# Patient Record
Sex: MALE | Race: BLACK OR AFRICAN AMERICAN | NOT HISPANIC OR LATINO | Employment: OTHER | URBAN - METROPOLITAN AREA
[De-identification: names, ages, dates, MRNs, and addresses within clinical notes are randomized per-mention and may not be internally consistent; named-entity substitution may affect disease eponyms.]

---

## 2022-02-24 PROBLEM — H43.12 VITREOUS HEMORRHAGE, LEFT: Status: ACTIVE | Noted: 2022-02-24

## 2022-02-24 PROBLEM — E11.3513 PROLIFERATIVE DIABETIC RETINOPATHY OF BOTH EYES WITH MACULAR EDEMA ASSOCIATED WITH TYPE 2 DIABETES MELLITUS: Status: ACTIVE | Noted: 2022-02-24

## 2022-02-24 PROBLEM — H40.52X4 NEOVASCULAR GLAUCOMA OF LEFT EYE, INDETERMINATE STAGE: Status: ACTIVE | Noted: 2022-02-24

## 2022-03-11 PROBLEM — H04.123 DRY EYES: Status: ACTIVE | Noted: 2022-03-11

## 2022-03-11 PROBLEM — H40.52X3: Status: ACTIVE | Noted: 2022-02-24

## 2022-03-11 PROBLEM — H40.1133 PRIMARY OPEN ANGLE GLAUCOMA (POAG) OF BOTH EYES, SEVERE STAGE: Status: ACTIVE | Noted: 2022-03-11

## 2022-10-02 PROBLEM — I24.9 ACUTE CORONARY SYNDROME: Status: ACTIVE | Noted: 2022-10-02

## 2022-10-02 PROBLEM — I10 HYPERTENSION: Status: ACTIVE | Noted: 2022-10-02

## 2022-10-02 PROBLEM — R79.89 ELEVATED SERUM CREATININE: Status: ACTIVE | Noted: 2022-10-02

## 2022-10-02 PROBLEM — R79.89 ELEVATED BRAIN NATRIURETIC PEPTIDE (BNP) LEVEL: Status: ACTIVE | Noted: 2022-10-02

## 2022-10-02 PROBLEM — N18.31 STAGE 3A CHRONIC KIDNEY DISEASE: Status: ACTIVE | Noted: 2022-10-02

## 2022-10-02 PROBLEM — I50.9 HEART FAILURE: Status: ACTIVE | Noted: 2022-10-02

## 2022-10-02 PROBLEM — E83.42 HYPOMAGNESEMIA: Status: ACTIVE | Noted: 2022-10-02

## 2022-10-02 PROBLEM — E87.6 HYPOKALEMIA: Status: ACTIVE | Noted: 2022-10-02

## 2022-10-02 PROBLEM — E78.5 HLD (HYPERLIPIDEMIA): Status: ACTIVE | Noted: 2022-10-02

## 2022-10-02 PROBLEM — R07.9 CHEST PAIN: Status: ACTIVE | Noted: 2022-10-02

## 2022-10-02 PROBLEM — E11.9 DIABETES: Status: ACTIVE | Noted: 2022-10-02

## 2022-10-02 PROBLEM — I16.1 HYPERTENSIVE EMERGENCY: Status: ACTIVE | Noted: 2022-10-02

## 2022-10-03 PROBLEM — E61.1 IRON DEFICIENCY: Status: ACTIVE | Noted: 2022-10-03

## 2022-10-03 PROBLEM — J96.01 ACUTE HYPOXEMIC RESPIRATORY FAILURE: Status: ACTIVE | Noted: 2022-10-03

## 2022-10-03 PROBLEM — E11.649 HYPOGLYCEMIA ASSOCIATED WITH TYPE 2 DIABETES MELLITUS: Status: ACTIVE | Noted: 2022-10-03

## 2022-10-04 PROBLEM — D72.820 LYMPHOCYTOSIS: Status: ACTIVE | Noted: 2022-10-04

## 2022-10-05 PROBLEM — I50.31 ACUTE HEART FAILURE WITH PRESERVED EJECTION FRACTION (HFPEF): Status: ACTIVE | Noted: 2022-10-05

## 2022-10-14 PROBLEM — N17.9 ACUTE RENAL FAILURE SUPERIMPOSED ON STAGE 3A CHRONIC KIDNEY DISEASE: Status: ACTIVE | Noted: 2022-10-14

## 2022-10-14 PROBLEM — I50.32 CHRONIC DIASTOLIC CONGESTIVE HEART FAILURE: Status: ACTIVE | Noted: 2022-10-14

## 2022-10-14 PROBLEM — N18.31 ACUTE RENAL FAILURE SUPERIMPOSED ON STAGE 3A CHRONIC KIDNEY DISEASE: Status: ACTIVE | Noted: 2022-10-14

## 2022-10-14 PROBLEM — K21.9 CHRONIC GERD: Status: ACTIVE | Noted: 2022-10-14

## 2023-01-02 PROBLEM — J96.01 ACUTE HYPOXEMIC RESPIRATORY FAILURE: Status: RESOLVED | Noted: 2022-10-03 | Resolved: 2023-01-02

## 2023-01-16 PROBLEM — N17.9 ACUTE RENAL FAILURE SUPERIMPOSED ON STAGE 3A CHRONIC KIDNEY DISEASE: Status: RESOLVED | Noted: 2022-10-14 | Resolved: 2023-01-16

## 2023-01-16 PROBLEM — N18.31 ACUTE RENAL FAILURE SUPERIMPOSED ON STAGE 3A CHRONIC KIDNEY DISEASE: Status: RESOLVED | Noted: 2022-10-14 | Resolved: 2023-01-16

## 2023-08-21 PROBLEM — Z09 FOLLOW-UP EXAM: Status: ACTIVE | Noted: 2023-08-21

## 2023-09-21 PROBLEM — K63.5 POLYP OF COLON: Status: ACTIVE | Noted: 2023-09-21

## 2023-09-21 PROBLEM — Z12.11 COLON CANCER SCREENING: Status: ACTIVE | Noted: 2023-09-21

## 2023-09-21 PROBLEM — K21.9 GASTROESOPHAGEAL REFLUX DISEASE: Status: ACTIVE | Noted: 2023-09-21

## 2023-11-20 PROBLEM — Z09 FOLLOW-UP EXAM: Status: RESOLVED | Noted: 2023-08-21 | Resolved: 2023-11-20

## 2023-12-16 PROBLEM — E66.09 CLASS 1 OBESITY DUE TO EXCESS CALORIES WITH SERIOUS COMORBIDITY AND BODY MASS INDEX (BMI) OF 30.0 TO 30.9 IN ADULT: Status: ACTIVE | Noted: 2023-12-16

## 2023-12-16 PROBLEM — N25.81 SECONDARY HYPERPARATHYROIDISM OF RENAL ORIGIN: Status: ACTIVE | Noted: 2023-12-16

## 2023-12-16 PROBLEM — N18.4 CKD (CHRONIC KIDNEY DISEASE) STAGE 4, GFR 15-29 ML/MIN: Status: ACTIVE | Noted: 2023-12-16

## 2023-12-16 PROBLEM — E11.21 DIABETIC NEPHROPATHY ASSOCIATED WITH TYPE 2 DIABETES MELLITUS: Status: ACTIVE | Noted: 2023-12-16

## 2023-12-16 PROBLEM — E66.811 CLASS 1 OBESITY DUE TO EXCESS CALORIES WITH SERIOUS COMORBIDITY AND BODY MASS INDEX (BMI) OF 30.0 TO 30.9 IN ADULT: Status: ACTIVE | Noted: 2023-12-16

## 2023-12-16 PROBLEM — E55.9 VITAMIN D INSUFFICIENCY: Status: ACTIVE | Noted: 2023-12-16

## 2024-01-19 PROBLEM — R80.9 ALBUMINURIA: Status: RESOLVED | Noted: 2024-01-19 | Resolved: 2024-01-19

## 2024-01-19 PROBLEM — R80.9 ALBUMINURIA: Status: ACTIVE | Noted: 2024-01-19

## 2024-03-15 PROBLEM — H40.53X2: Status: ACTIVE | Noted: 2022-02-24

## 2024-04-09 ENCOUNTER — PATIENT OUTREACH (OUTPATIENT)
Dept: ADMINISTRATIVE | Facility: HOSPITAL | Age: 74
End: 2024-04-09

## 2024-05-26 PROBLEM — N20.0 BILATERAL NEPHROLITHIASIS: Status: ACTIVE | Noted: 2024-05-26

## 2024-11-14 PROBLEM — E87.20 METABOLIC ACIDOSIS: Status: ACTIVE | Noted: 2024-11-14

## 2024-12-14 PROBLEM — E11.42 DIABETIC POLYNEUROPATHY ASSOCIATED WITH TYPE 2 DIABETES MELLITUS: Status: ACTIVE | Noted: 2024-12-14

## 2025-03-27 PROBLEM — R79.89 ELEVATED SERUM CREATININE: Status: RESOLVED | Noted: 2022-10-02 | Resolved: 2025-03-27

## 2025-03-27 PROBLEM — E83.42 HYPOMAGNESEMIA: Status: RESOLVED | Noted: 2022-10-02 | Resolved: 2025-03-27

## 2025-03-27 PROBLEM — E87.6 HYPOKALEMIA: Status: RESOLVED | Noted: 2022-10-02 | Resolved: 2025-03-27

## 2025-03-27 PROBLEM — E11.649 HYPOGLYCEMIA ASSOCIATED WITH TYPE 2 DIABETES MELLITUS: Status: RESOLVED | Noted: 2022-10-03 | Resolved: 2025-03-27

## 2025-05-01 PROBLEM — S83.242A ACUTE MEDIAL MENISCUS TEAR OF LEFT KNEE: Status: ACTIVE | Noted: 2025-05-01

## 2025-06-20 ENCOUNTER — PATIENT OUTREACH (OUTPATIENT)
Dept: ADMINISTRATIVE | Facility: OTHER | Age: 75
End: 2025-06-20

## 2025-06-20 NOTE — PROGRESS NOTES
CHW Follow Up.  The patient has started a new enrollment for Jardiance through [employer]. The application has been submitted for review.  A determination will be made in 5 to 7 days.       Christina De Leon, [facility] [place], CP, CHW    [phone] Ph  [phone] Fax

## 2025-06-24 ENCOUNTER — PATIENT OUTREACH (OUTPATIENT)
Dept: ADMINISTRATIVE | Facility: OTHER | Age: 75
End: 2025-06-24

## 2025-06-24 NOTE — PROGRESS NOTES
CHW Follow Up. The patients has been approved for the Basaglar until 12/2025. The medication will be delivered in 5 to 7 days. Pt has been updated on outcome.     Christina De Leon, [facility] CPhT, CHW    [phone] Ph  [phone] Fax

## 2025-06-26 ENCOUNTER — PATIENT OUTREACH (OUTPATIENT)
Dept: ADMINISTRATIVE | Facility: OTHER | Age: 75
End: 2025-06-26

## 2025-06-26 NOTE — PROGRESS NOTES
CHW Follow Up. The patient called regarding a script through the  [employer] for Basglar. According to the patient, the script was missing some information. The required details have been provided  to Gerardo the Pharmacist, at [facility].The patient's medication will be delivered within 5 to 7 days. The patient has been updated on the medication status.       Christina De Leon [facility], [place], CHW    [phone]   [phone] Fax

## 2025-08-12 ENCOUNTER — PATIENT OUTREACH (OUTPATIENT)
Dept: ADMINISTRATIVE | Facility: HOSPITAL | Age: 75
End: 2025-08-12

## 2025-08-12 DIAGNOSIS — E11.42 DIABETIC POLYNEUROPATHY ASSOCIATED WITH TYPE 2 DIABETES MELLITUS: Primary | ICD-10-CM

## 2025-08-18 ENCOUNTER — PATIENT OUTREACH (OUTPATIENT)
Dept: ADMINISTRATIVE | Facility: HOSPITAL | Age: 75
End: 2025-08-18